# Patient Record
Sex: FEMALE | Race: WHITE | NOT HISPANIC OR LATINO | Employment: FULL TIME | URBAN - METROPOLITAN AREA
[De-identification: names, ages, dates, MRNs, and addresses within clinical notes are randomized per-mention and may not be internally consistent; named-entity substitution may affect disease eponyms.]

---

## 2021-04-27 ENCOUNTER — OFFICE VISIT (OUTPATIENT)
Dept: PHYSICAL THERAPY | Facility: CLINIC | Age: 56
End: 2021-04-27
Payer: COMMERCIAL

## 2021-04-27 DIAGNOSIS — G89.29 CHRONIC LEFT SHOULDER PAIN: Primary | ICD-10-CM

## 2021-04-27 DIAGNOSIS — M54.16 LUMBAR RADICULOPATHY: ICD-10-CM

## 2021-04-27 DIAGNOSIS — M25.552 LEFT HIP PAIN: ICD-10-CM

## 2021-04-27 DIAGNOSIS — M25.512 CHRONIC LEFT SHOULDER PAIN: Primary | ICD-10-CM

## 2021-04-27 PROCEDURE — 97161 PT EVAL LOW COMPLEX 20 MIN: CPT

## 2021-04-27 NOTE — LETTER
2021    Sandra Miranda DO  [address]  [address] [facility]    Patient: Annie Rodríguez   YOB: 1965   Date of Visit: 2021     Encounter Diagnosis     ICD-10-CM    1  Chronic left shoulder pain  M25 512     G89 29    2  Left hip pain  M25 552    3  Lumbar radiculopathy  M54 16        Dear Dr Camille Alarcon: Thank you for your recent referral of Annie Rodríguez  Please review the attached evaluation summary from Munira's recent visit  Please verify that you agree with the plan of care by signing the attached order  If you have any questions or concerns, please do not hesitate to call  I sincerely appreciate the opportunity to share in the care of one of your patients and hope to have another opportunity to work with you in the near future  Sincerely,    Meron Reyes, PT      Referring Provider:      I certify that I have read the below Plan of Care and certify the need for these services furnished under this plan of treatment while under my care  Sandra Miranda DO  [address]  [address] [facility]  Via Fax: [phone]          PT Evaluation     Today's date: 2021  Patient name: Annie Rodríguez  : 1965  MRN: [id number]  Referring provider: Lion Helm PT  Dx:   Encounter Diagnosis     ICD-10-CM    1  Chronic left shoulder pain  M25 512     G89 29    2  Left hip pain  M25 552    3  Lumbar radiculopathy  M54 16        Start Time: 830  Stop Time: 930  Total time in clinic (min): 60 minutes    Assessment  Assessment details: Patient is a 59 yo female presenting to her direct access physical therapy evaluation with symptoms consistent with L shoulder/elbow pain as well as L sided lumbar radiculopathy that started about 6 weeks ago  Patient presents with decreased hip and shoulder strength, decreased lumbar and cervical ROM, decreased endurance, decreased  strength and poor posture   Patient has increased difficulty with standing and sitting for a period of time, sleeping, work related activities  PT educated the patient on posture and was provided a HEP  PT will address the noted impairments by performing hip and shoulder strengthening, stretching, balance, functional activities and manual techniques to allow the patient to return to her PLOF  PT recommended 1-2x/week for 4-6 weeks c a good prognosis 2* PLOF  Impairments: abnormal or restricted ROM, activity intolerance, impaired physical strength, lacks appropriate home exercise program, pain with function, poor posture  and poor body mechanics    Symptom irritability: lowUnderstanding of Dx/Px/POC: good   Prognosis: good    Goals  STG: In four weeks the patient will:    1  Be (I) with her HEP  2  Increase hip and shoulder strength to 4+/5 MMT score to assist c ADLs  3  Increase lumbar and cervical ROM by 25% to assist c work related activities  LTG: In six weeks, the patient will:    1  Increase FOTO score to 76 for the shoulder and 93 for the hip to demonstrate improvements in symptoms and function  2  Demonstrate full lumbar and cervical AROM without pain  3  Perform 60-90 mins of sitting at work without pain  4  Increase hip and shoulder strength to 5/5 MMT score to assist c prolonged activities  5  Walk 2 miles without discomfort in the LEs          Plan  Patient would benefit from: skilled physical therapy  Referral necessary: Yes  Planned modality interventions: cryotherapy and thermotherapy: hydrocollator packs  Planned therapy interventions: abdominal trunk stabilization, joint mobilization, manual therapy, massage, neuromuscular re-education, patient education, postural training, strengthening, stretching, therapeutic activities, therapeutic exercise, transfer training, balance, body mechanics training, breathing training, functional ROM exercises, home exercise program and flexibility  Frequency: 2x week  Duration in visits: 12  Duration in weeks: 6  Plan of Care beginning date: 2021  Plan of Care expiration date: 2021  Treatment plan discussed with: patient        Subjective Evaluation    History of Present Illness  Mechanism of injury: Patient ntoed about 6 weeks ago she did a yoga pose and noted a twinge in her hamstring  After that she has on and off burning pain shooting from the anterior hip to the L foot  Patient noted some numbness on the top of the foot when she wakes up  Patient noted her knee has given out twice  Patient noted that she has increased discomfort with prolonged positions such as walking and sitting  Patient noted that her L shoulder pain started a while ago  Patient noted that she is L handed, however she uses her R hand with the mouse  Patient noted she rotates when working when seated or standing  Patient noted no pain with lifting, however she has pain with prolonged positions             Recurrent probem    Quality of life: good    Pain  Current pain ratin  At best pain ratin  At worst pain rating: 3  Location: L posterior hip to anterior thigh to L foot; L side of neck -> shoulder to elbow  Quality: burning, dull ache and discomfort (Numbness at times)  Relieving factors: ice, heat and rest  Aggravating factors: standing, walking and sitting  Progression: worsening    Social Support  Steps to enter house: no  Stairs in house: no   Lives in: [place] house  Lives with: spouse    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)  Hand dominance: left      Diagnostic Tests  No diagnostic tests performed  Patient Goals  Patient goals for therapy: increased strength, independence with ADLs/IADLs, return to sport/leisure activities, decreased pain and increased motion  Patient goal: "to learn exercises to assist c pain " "to work with less pain "        Objective     Postural Observations  Seated posture: poor  Standing posture: poor  Correction of posture: makes symptoms better        Neurological Testing     Sensation     Lumbar   Left   Intact: light touch    Right   Intact: light touch    Active Range of Motion   Cervical/Thoracic Spine       Cervical    Flexion:  Restriction level: moderate  Extension:  Restriction level: minimal  Left lateral flexion:  Restriction level: minimal  Right lateral flexion:  with pain Restriction level moderate  Left rotation:  Restriction level: moderate  Right rotation:  Restriction level: moderate    Lumbar   Flexion:  Restriction level: moderate  Extension:  with pain Restriction level: minimal  Left lateral flexion:  Restriction level: minimal  Right lateral flexion:  Restriction level: minimal  Left rotation:  Restriction level: minimal  Right rotation:  Restriction level: minimal    Strength/Myotome Testing     Left Shoulder     Planes of Motion   Flexion: 4   Abduction: 4-   External rotation at 0°: 4-   Internal rotation at 0°: 4-     Right Shoulder     Planes of Motion   Flexion: 4   Abduction: 4-   External rotation at 0°: 4-   Internal rotation at 0°: 4-     Left Elbow   Flexion: 4  Extension: 4    Right Elbow   Flexion: 4  Extension: 4    Left Wrist/Hand   Wrist extension: 4  Wrist flexion: 4     (2nd hand position)     Trial 1: 18    Right Wrist/Hand   Wrist extension: 4+  Wrist flexion: 4+     (2nd hand position)     Trial 1: 32    Left Hip   Planes of Motion   Flexion: 4  Abduction: 4-  Adduction: 4  External rotation: 4-  Internal rotation: 4-    Right Hip   Planes of Motion   Flexion: 4  Abduction: 4-  Adduction: 4  External rotation: 4-  Internal rotation: 4-    Left Knee   Extension: 4    Right Knee   Extension: 4    Left Ankle/Foot   Dorsiflexion: 4+  Plantar flexion: 4+    Right Ankle/Foot   Dorsiflexion: 4+  Plantar flexion: 4+    General Comments:      Shoulder Comments   Shoulder ROM WNL             Precautions: none      Manuals 4/27            Cervical mobilizations  nv            Piriformis STM nv            Wrist flexors STM nv                         Neuro Re-Ed             Posture edu MW            Sciatic nerve glides x10 ea            Serratus punch x10            Chin tuck nv            Scapular retraction x10  5" hold            clams nv            Eccentric wrist flexion and extension  nv            Ther Ex             X-ride nv            Piriformis stretch 3x30" ea            Open books nv            UT and levator scap stretch nv            Rhomboid stretch nv            Hip add nv            Hip abd nv            Wrist flexion and extension stretch nv            Ther Activity                                       Gait Training                                       Modalities

## 2021-04-27 NOTE — PROGRESS NOTES
PT Evaluation     Today's date: 2021  Patient name: Gen Barkley  : 1965  MRN: [id number]  Referring provider: Janes Edwards PT  Dx:   Encounter Diagnosis     ICD-10-CM    1  Chronic left shoulder pain  M25 512     G89 29    2  Left hip pain  M25 552    3  Lumbar radiculopathy  M54 16        Start Time: 830  Stop Time: 930  Total time in clinic (min): 60 minutes    Assessment  Assessment details: Patient is a 61 yo female presenting to her direct access physical therapy evaluation with symptoms consistent with L shoulder/elbow pain as well as L sided lumbar radiculopathy that started about 6 weeks ago  Patient presents with decreased hip and shoulder strength, decreased lumbar and cervical ROM, decreased endurance, decreased  strength and poor posture  Patient has increased difficulty with standing and sitting for a period of time, sleeping, work related activities  PT educated the patient on posture and was provided a HEP  PT will address the noted impairments by performing hip and shoulder strengthening, stretching, balance, functional activities and manual techniques to allow the patient to return to her PLOF  PT recommended 1-2x/week for 4-6 weeks c a good prognosis 2* PLOF  Impairments: abnormal or restricted ROM, activity intolerance, impaired physical strength, lacks appropriate home exercise program, pain with function, poor posture  and poor body mechanics    Symptom irritability: lowUnderstanding of Dx/Px/POC: good   Prognosis: good    Goals  STG: In four weeks the patient will:    1  Be (I) with her HEP  2  Increase hip and shoulder strength to 4+/5 MMT score to assist c ADLs  3  Increase lumbar and cervical ROM by 25% to assist c work related activities  LTG: In six weeks, the patient will:    1  Increase FOTO score to 76 for the shoulder and 93 for the hip to demonstrate improvements in symptoms and function    2  Demonstrate full lumbar and cervical AROM without pain   3  Perform 60-90 mins of sitting at work without pain  4  Increase hip and shoulder strength to 5/5 MMT score to assist c prolonged activities  5  Walk 2 miles without discomfort in the LEs  Plan  Patient would benefit from: skilled physical therapy  Referral necessary: Yes  Planned modality interventions: cryotherapy and thermotherapy: hydrocollator packs  Planned therapy interventions: abdominal trunk stabilization, joint mobilization, manual therapy, massage, neuromuscular re-education, patient education, postural training, strengthening, stretching, therapeutic activities, therapeutic exercise, transfer training, balance, body mechanics training, breathing training, functional ROM exercises, home exercise program and flexibility  Frequency: 2x week  Duration in visits: 12  Duration in weeks: 6  Plan of Care beginning date: 2021  Plan of Care expiration date: 2021  Treatment plan discussed with: patient        Subjective Evaluation    History of Present Illness  Mechanism of injury: Patient ntoed about 6 weeks ago she did a yoga pose and noted a twinge in her hamstring  After that she has on and off burning pain shooting from the anterior hip to the L foot  Patient noted some numbness on the top of the foot when she wakes up  Patient noted her knee has given out twice  Patient noted that she has increased discomfort with prolonged positions such as walking and sitting  Patient noted that her L shoulder pain started a while ago  Patient noted that she is L handed, however she uses her R hand with the mouse  Patient noted she rotates when working when seated or standing  Patient noted no pain with lifting, however she has pain with prolonged positions             Recurrent probem    Quality of life: good    Pain  Current pain ratin  At best pain ratin  At worst pain rating: 3  Location: L posterior hip to anterior thigh to L foot; L side of neck -> shoulder to elbow  Quality: burning, dull ache and discomfort (Numbness at times)  Relieving factors: ice, heat and rest  Aggravating factors: standing, walking and sitting  Progression: worsening    Social Support  Steps to enter house: no  Stairs in house: no   Lives in: [place] house  Lives with: spouse    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)  Hand dominance: left      Diagnostic Tests  No diagnostic tests performed  Patient Goals  Patient goals for therapy: increased strength, independence with ADLs/IADLs, return to sport/leisure activities, decreased pain and increased motion  Patient goal: "to learn exercises to assist c pain " "to work with less pain "        Objective     Postural Observations  Seated posture: poor  Standing posture: poor  Correction of posture: makes symptoms better        Neurological Testing     Sensation     Lumbar   Left   Intact: light touch    Right   Intact: light touch    Active Range of Motion   Cervical/Thoracic Spine       Cervical    Flexion:  Restriction level: moderate  Extension:  Restriction level: minimal  Left lateral flexion:  Restriction level: minimal  Right lateral flexion:  with pain Restriction level moderate  Left rotation:  Restriction level: moderate  Right rotation:  Restriction level: moderate    Lumbar   Flexion:  Restriction level: moderate  Extension:  with pain Restriction level: minimal  Left lateral flexion:  Restriction level: minimal  Right lateral flexion:  Restriction level: minimal  Left rotation:  Restriction level: minimal  Right rotation:  Restriction level: minimal    Strength/Myotome Testing     Left Shoulder     Planes of Motion   Flexion: 4   Abduction: 4-   External rotation at 0°: 4-   Internal rotation at 0°: 4-     Right Shoulder     Planes of Motion   Flexion: 4   Abduction: 4-   External rotation at 0°: 4-   Internal rotation at 0°: 4-     Left Elbow   Flexion: 4  Extension: 4    Right Elbow   Flexion: 4  Extension: 4    Left Wrist/Hand   Wrist extension: 4  Wrist flexion: 4     (2nd hand position)     Trial 1: 18    Right Wrist/Hand   Wrist extension: 4+  Wrist flexion: 4+     (2nd hand position)     Trial 1: 32    Left Hip   Planes of Motion   Flexion: 4  Abduction: 4-  Adduction: 4  External rotation: 4-  Internal rotation: 4-    Right Hip   Planes of Motion   Flexion: 4  Abduction: 4-  Adduction: 4  External rotation: 4-  Internal rotation: 4-    Left Knee   Extension: 4    Right Knee   Extension: 4    Left Ankle/Foot   Dorsiflexion: 4+  Plantar flexion: 4+    Right Ankle/Foot   Dorsiflexion: 4+  Plantar flexion: 4+    General Comments:      Shoulder Comments   Shoulder ROM WNL             Precautions: none      Manuals 4/27            Cervical mobilizations  nv            Piriformis STM nv            Wrist flexors STM nv                         Neuro Re-Ed             Posture edu MW            Sciatic nerve glides x10 ea            Serratus punch x10            Chin tuck nv            Scapular retraction x10  5" hold            clams nv            Eccentric wrist flexion and extension  nv            Ther Ex             X-ride nv            Piriformis stretch 3x30" ea            Open books nv            UT and levator scap stretch nv            Rhomboid stretch nv            Hip add nv            Hip abd nv            Wrist flexion and extension stretch nv            Ther Activity                                       Gait Training                                       Modalities

## 2021-04-28 ENCOUNTER — TRANSCRIBE ORDERS (OUTPATIENT)
Dept: PHYSICAL THERAPY | Facility: CLINIC | Age: 56
End: 2021-04-28

## 2021-04-28 DIAGNOSIS — M25.552 LEFT HIP PAIN: ICD-10-CM

## 2021-04-28 DIAGNOSIS — M54.16 LUMBAR RADICULOPATHY: ICD-10-CM

## 2021-04-28 DIAGNOSIS — G89.29 CHRONIC LEFT SHOULDER PAIN: Primary | ICD-10-CM

## 2021-04-28 DIAGNOSIS — M25.512 CHRONIC LEFT SHOULDER PAIN: Primary | ICD-10-CM

## 2021-04-29 ENCOUNTER — OFFICE VISIT (OUTPATIENT)
Dept: PHYSICAL THERAPY | Facility: CLINIC | Age: 56
End: 2021-04-29
Payer: COMMERCIAL

## 2021-04-29 DIAGNOSIS — M25.512 CHRONIC LEFT SHOULDER PAIN: Primary | ICD-10-CM

## 2021-04-29 DIAGNOSIS — M54.16 LUMBAR RADICULOPATHY: ICD-10-CM

## 2021-04-29 DIAGNOSIS — M25.552 LEFT HIP PAIN: ICD-10-CM

## 2021-04-29 DIAGNOSIS — G89.29 CHRONIC LEFT SHOULDER PAIN: Primary | ICD-10-CM

## 2021-04-29 PROCEDURE — 97112 NEUROMUSCULAR REEDUCATION: CPT

## 2021-04-29 NOTE — PROGRESS NOTES
Daily Note     Today's date: 2021  Patient name: Willena Castleman  : 1965  MRN: [id number]  Referring provider: Catherine Hwang, PT  Dx:   Encounter Diagnosis     ICD-10-CM    1  Chronic left shoulder pain  M25 512     G89 29    2  Left hip pain  M25 552    3  Lumbar radiculopathy  M54 16        Start Time: 730  Stop Time: 830  Total time in clinic (min): 60 minutes    Subjective: Patient noted her knee gave out on Tuesday, however after ice it feels better  Patient has been performing her HEP  Objective: See treatment diary below      Assessment: Patient performed various strengthening and stretching exercises to assist with pain in the shoulder and hip  Patient required min VCs for form throughout the session  Patient noted less pain post session  PT added to her HEP  Patient would benefit from continued PT to allow the patient to return to her PLOF  Plan: Continue per plan of care        Precautions: none      Manuals            Cervical mobilizations  nv            Piriformis STM nv            Wrist flexors STM nv                         Neuro Re-Ed             Posture edu MW HEP edu           Sciatic nerve glides x10 ea 2x10 ea           Serratus punch x10 2x10           Chin tuck nv 2x10           Scapular retraction x10  5" hold 2x10  5" hold           clams nv            Eccentric wrist flexion and extension  nv            Ther Ex             X-ride nv 10'           Piriformis stretch 3x30" ea 3x30" ea           Open books nv            UT and levator scap stretch nv            Rhomboid stretch nv 5x15" ea           Hip add nv 2x10  5" hold           sidelying Hip abd nv 2x10 ea           Wrist flexion and extension stretch nv 3x30" ea           Ther Activity                                       Gait Training                                       Modalities

## 2021-05-03 ENCOUNTER — OFFICE VISIT (OUTPATIENT)
Dept: PHYSICAL THERAPY | Facility: CLINIC | Age: 56
End: 2021-05-03
Payer: COMMERCIAL

## 2021-05-03 DIAGNOSIS — G89.29 CHRONIC LEFT SHOULDER PAIN: Primary | ICD-10-CM

## 2021-05-03 DIAGNOSIS — M54.16 LUMBAR RADICULOPATHY: ICD-10-CM

## 2021-05-03 DIAGNOSIS — M25.552 LEFT HIP PAIN: ICD-10-CM

## 2021-05-03 DIAGNOSIS — M25.512 CHRONIC LEFT SHOULDER PAIN: Primary | ICD-10-CM

## 2021-05-03 PROCEDURE — 97112 NEUROMUSCULAR REEDUCATION: CPT

## 2021-05-03 NOTE — PROGRESS NOTES
Daily Note     Today's date: 5/3/2021  Patient name: Kerwin Lopez  : 1965  MRN: [id number]  Referring provider: Pasquale Ramey, DARNELL  Dx:   Encounter Diagnosis     ICD-10-CM    1  Chronic left shoulder pain  M25 512     G89 29    2  Left hip pain  M25 552    3  Lumbar radiculopathy  M54 16        Start Time: 730  Stop Time: 830  Total time in clinic (min): 60 minutes    Subjective: Patient noted she felt good after last session, however the pain came back the next day  Patient noted she performed her HEP over the weekend  Objective: See treatment diary below      Assessment: PT performed STM to the L piriformis and glute med to assist c pain levels  Patient noted improvements post manuals  Patient required min VCs for form throughout the session  PT introduced sidelying shoulder external rotation to assist c shoulder stabilization  PT added to HEP  Patient would benefit from continued PT to allow the patient to return to her PLOF  Plan: Continue per plan of care        Precautions: none      Manuals  5/3          Cervical mobilizations  nv            Piriformis STM nv  MW          Wrist flexors STM nv                         Neuro Re-Ed             Posture edu MW HEP edu           Sciatic nerve glides x10 ea 2x10 ea 2x10 ea          Serratus punch x10 2x10 2x10          Chin tuck nv 2x10 2x10          Scapular retraction x10  5" hold 2x10  5" hold           clams nv  nv          Eccentric wrist flexion and extension  nv            sidelying external rotation   2x10 ea          Ther Ex             X-ride nv 10' 10'          Piriformis stretch 3x30" ea 3x30" ea 3x30" ea          Open books nv  5x15" ea          UT and levator scap stretch nv            Rhomboid stretch nv 5x15" ea 5x15"ea          Hip add nv 2x10  5" hold           sidelying Hip abd nv 2x10 ea 2x10 ea          Wrist flexion and extension stretch nv 3x30" ea 3x30" ea          Ther Activity Gait Training                                       Modalities

## 2021-05-05 ENCOUNTER — OFFICE VISIT (OUTPATIENT)
Dept: PHYSICAL THERAPY | Facility: CLINIC | Age: 56
End: 2021-05-05
Payer: COMMERCIAL

## 2021-05-05 DIAGNOSIS — M54.16 LUMBAR RADICULOPATHY: ICD-10-CM

## 2021-05-05 DIAGNOSIS — M25.552 LEFT HIP PAIN: ICD-10-CM

## 2021-05-05 DIAGNOSIS — M25.512 CHRONIC LEFT SHOULDER PAIN: Primary | ICD-10-CM

## 2021-05-05 DIAGNOSIS — G89.29 CHRONIC LEFT SHOULDER PAIN: Primary | ICD-10-CM

## 2021-05-05 PROCEDURE — 97112 NEUROMUSCULAR REEDUCATION: CPT

## 2021-05-05 NOTE — PROGRESS NOTES
Daily Note     Today's date: 2021  Patient name: Minda Sharpe  : 1965  MRN: [id number]  Referring provider: Farooq Ghotra PT  Dx:   Encounter Diagnosis     ICD-10-CM    1  Chronic left shoulder pain  M25 512     G89 29    2  Left hip pain  M25 552    3  Lumbar radiculopathy  M54 16        Start Time: 30  Stop Time: 1030  Total time in clinic (min): 60 minutes    Subjective: Patient noted that she felt good after last session  Patient noted in general she is sore on the L side possibly from the weather  Objective: See treatment diary below      Assessment: PT performed STM to the piriformis and L infraspinatus due to pain  Patient noted improvements post manuals  PT introduced clams to assist c stabilization  Patient required moderate cues throughout the session  Patient noted less pain at the end of the session  Patient would benefit from continued PT to allow the patient to return to her PLOF  Plan: Continue per plan of care        Precautions: none      Manuals 4/27 4/29 5/3 5         Cervical mobilizations  nv            Piriformis STM nv  MW MW   + L infraspinatus         Wrist flexors STM nv                         Neuro Re-Ed             Posture edu MW HEP edu           Sciatic nerve glides x10 ea 2x10 ea 2x10 ea          Serratus punch x10 2x10 2x10 x25         Chin tuck nv 2x10 2x10 x25  5" hold         Scapular retraction x10  5" hold 2x10  5" hold           clams nv  nv 2x10 ea         Eccentric wrist flexion and extension  nv            sidelying external rotation   2x10 ea 2x10 ea         Ther Ex             X-ride nv 10' 10' 10'         Piriformis stretch 3x30" ea 3x30" ea 3x30" ea 3x30" ea         Open books nv  5x15" ea 5x15" ea         UT and levator scap stretch nv            Rhomboid stretch nv 5x15" ea 5x15"ea          Hip add nv 2x10  5" hold           sidelying Hip abd nv 2x10 ea 2x10 ea 2x10 e         Wrist flexion and extension stretch nv 3x30" ea 3x30" ea          Ther Activity                                       Gait Training                                       Modalities

## 2021-05-11 ENCOUNTER — OFFICE VISIT (OUTPATIENT)
Dept: PHYSICAL THERAPY | Facility: CLINIC | Age: 56
End: 2021-05-11
Payer: COMMERCIAL

## 2021-05-11 DIAGNOSIS — M25.512 CHRONIC LEFT SHOULDER PAIN: Primary | ICD-10-CM

## 2021-05-11 DIAGNOSIS — M54.16 LUMBAR RADICULOPATHY: ICD-10-CM

## 2021-05-11 DIAGNOSIS — M25.552 LEFT HIP PAIN: ICD-10-CM

## 2021-05-11 DIAGNOSIS — G89.29 CHRONIC LEFT SHOULDER PAIN: Primary | ICD-10-CM

## 2021-05-11 PROCEDURE — 97112 NEUROMUSCULAR REEDUCATION: CPT

## 2021-05-11 NOTE — PROGRESS NOTES
Daily Note     Today's date: 2021  Patient name: Keyanna Melara  : 1965  MRN: [id number]  Referring provider: Roseanne Plummer PT  Dx:   Encounter Diagnosis     ICD-10-CM    1  Chronic left shoulder pain  M25 512     G89 29    2  Left hip pain  M25 552    3  Lumbar radiculopathy  M54 16        Start Time: 830  Stop Time: 925  Total time in clinic (min): 55 minutes    Subjective: Patient noted the weekend went well, however her knee gave out yesterday for about an hour  Objective: See treatment diary below      Assessment: PT introduced the leg press as well as 3 way hip to assist c stabilization in the knee for functional activities  Patient required min VCs, however did not note pain or a giving out sensation  PT added to her HEP  Overall the patient continues to improve with strength 2* less breaks throughout the session  Patient would benefit from continued PT to allow the patient to return to her PLOF  Plan: Continue per plan of care        Precautions: none      Manuals 4/27 4/29 5/3 5/5 5/11        Cervical mobilizations  nv            Piriformis STM nv  MW MW   + L infraspinatus         Wrist flexors STM nv                         Neuro Re-Ed             Posture edu MW HEP edu           Sciatic nerve glides x10 ea 2x10 ea 2x10 ea  HEP        Serratus punch x10 2x10 2x10 x25 2x10  2#        Chin tuck nv 2x10 2x10 x25  5" hold         Scapular retraction x10  5" hold 2x10  5" hold   HEP        clams nv  nv 2x10 ea 2x10 ea        Eccentric wrist flexion and extension  nv            Leg press     DL: 60#  2x10  SL: 30# ea        sidelying external rotation   2x10 ea 2x10 ea 2x10 ea        Ther Ex             X-ride nv 10' 10' 10' 10'        Piriformis stretch 3x30" ea 3x30" ea 3x30" ea 3x30" ea 3x30" ea        Open books nv  5x15" ea 5x15" ea 5x15" ea        UT and levator scap stretch nv            Rhomboid stretch nv 5x15" ea 5x15"ea          Hip add nv 2x10  5" hold sidelying Hip abd nv 2x10 ea 2x10 ea 2x10 ea 2x10 ea        Wrist flexion and extension stretch nv 3x30" ea 3x30" ea          3 way hip     2x10 ea  firm        Ther Activity                                       Gait Training                                       Modalities

## 2021-05-13 ENCOUNTER — OFFICE VISIT (OUTPATIENT)
Dept: PHYSICAL THERAPY | Facility: CLINIC | Age: 56
End: 2021-05-13
Payer: COMMERCIAL

## 2021-05-13 DIAGNOSIS — M25.512 CHRONIC LEFT SHOULDER PAIN: Primary | ICD-10-CM

## 2021-05-13 DIAGNOSIS — M54.16 LUMBAR RADICULOPATHY: ICD-10-CM

## 2021-05-13 DIAGNOSIS — M25.552 LEFT HIP PAIN: ICD-10-CM

## 2021-05-13 DIAGNOSIS — G89.29 CHRONIC LEFT SHOULDER PAIN: Primary | ICD-10-CM

## 2021-05-13 PROCEDURE — 97112 NEUROMUSCULAR REEDUCATION: CPT

## 2021-05-13 NOTE — PROGRESS NOTES
Daily Note     Today's date: 2021  Patient name: Miracle Antonio  : 1965  MRN: [id number]  Referring provider: Gunjan Castrejon, PT  Dx:   Encounter Diagnosis     ICD-10-CM    1  Chronic left shoulder pain  M25 512     G89 29    2  Left hip pain  M25 552    3  Lumbar radiculopathy  M54 16        Start Time: 1200  Stop Time: 1255  Total time in clinic (min): 55 minutes    Subjective: Patient noted in general she is feeling good, however her shoulder is a little sore due to working at her desk  Objective: See treatment diary below      Assessment: Patient performed strengthening exercises with min VCs for form and the speed at which she performs them  In general her strength has been improving due to increased reps and less breaks  PT performed trigger point release to piriformis/glute med, UT and infraspinatus to assist c pain levels  Patient noted she felt better post session  PT educated the patient on her HEP  Patient would benefit from continued PT to allow the patient to return to her PLOF  Plan: Continue per plan of care        Precautions: none      Manuals 4/27 4/29 5/3 5/5 5/11 5/13       Cervical mobilizations  nv            Piriformis STM nv  MW MW   + L infraspinatus  MW       Wrist flexors STM nv            UT and infraspinatus (L)      MW       Neuro Re-Ed             Posture edu MW HEP edu           Sciatic nerve glides x10 ea 2x10 ea 2x10 ea  HEP        Serratus punch x10 2x10 2x10 x25 2x10  2# nv       Chin tuck nv 2x10 2x10 x25  5" hold         Scapular retraction x10  5" hold 2x10  5" hold   HEP        clams nv  nv 2x10 ea 2x10 ea x25 ea       Eccentric wrist flexion and extension  nv            Leg press     DL: 60#  2x10  SL: 30# ea DL: 60#  2x10  SL: 30# ea       sidelying external rotation   2x10 ea 2x10 ea 2x10 ea x25 ea       Prone I and T      nv       Ther Ex             X-ride nv 10' 10' 10' 10' 10'       Piriformis stretch 3x30" ea 3x30" ea 3x30" ea 3x30" ea 3x30" ea        Open books nv  5x15" ea 5x15" ea 5x15" ea        UT and levator scap stretch nv            Rhomboid stretch nv 5x15" ea 5x15"ea          Hip add nv 2x10  5" hold           sidelying Hip abd nv 2x10 ea 2x10 ea 2x10 ea 2x10 ea nv       Wrist flexion and extension stretch nv 3x30" ea 3x30" ea          3 way hip     2x10 ea  firm nv       Ther Activity                                       Gait Training                                       Modalities

## 2021-05-14 ENCOUNTER — APPOINTMENT (OUTPATIENT)
Dept: PHYSICAL THERAPY | Facility: CLINIC | Age: 56
End: 2021-05-14
Payer: COMMERCIAL

## 2021-05-17 ENCOUNTER — OFFICE VISIT (OUTPATIENT)
Dept: PHYSICAL THERAPY | Facility: CLINIC | Age: 56
End: 2021-05-17
Payer: COMMERCIAL

## 2021-05-17 DIAGNOSIS — M25.512 CHRONIC LEFT SHOULDER PAIN: Primary | ICD-10-CM

## 2021-05-17 DIAGNOSIS — G89.29 CHRONIC LEFT SHOULDER PAIN: Primary | ICD-10-CM

## 2021-05-17 DIAGNOSIS — M54.16 LUMBAR RADICULOPATHY: ICD-10-CM

## 2021-05-17 DIAGNOSIS — M25.552 LEFT HIP PAIN: ICD-10-CM

## 2021-05-17 PROCEDURE — 97112 NEUROMUSCULAR REEDUCATION: CPT

## 2021-05-17 NOTE — PROGRESS NOTES
Daily Note     Today's date: 2021  Patient name: Roberth Fitzgerald  : 1965  MRN: [id number]  Referring provider: Shon Leroy PT  Dx:   Encounter Diagnosis     ICD-10-CM    1  Chronic left shoulder pain  M25 512     G89 29    2  Left hip pain  M25 552    3  Lumbar radiculopathy  M54 16        Start Time: 08  Stop Time: 930  Total time in clinic (min): 60 minutes    Subjective: Patient noted her knee did not give way this past weekend and her neck is feeling better  Objective: See treatment diary below      Assessment: PT introduced prone I and Ts as well as the skier stretch to assist c stabilization for reaching  Patient required min VCs for form throughout the session  Patient continues to improve with strength 2* less pain with ADLs  Patient noted a decrease in pain post manuals  Patient would benefit from continued PT to allow the patient to return to her PLOF  Plan: Continue per plan of care        Precautions: none      Manuals 4/27 4/29 5/3 5/5 5/11 5/13 5/17      Cervical mobilizations  nv            Piriformis STM nv  MW MW   + L infraspinatus  MW       Wrist flexors STM nv            UT and infraspinatus (L)      MW MW  + sub-occipital release      Neuro Re-Ed             Posture edu MW HEP edu           Sciatic nerve glides x10 ea 2x10 ea 2x10 ea  HEP        Serratus punch x10 2x10 2x10 x25 2x10  2# nv 2x10  3#      Chin tuck nv 2x10 2x10 x25  5" hold         Scapular retraction x10  5" hold 2x10  5" hold   HEP        clams nv  nv 2x10 ea 2x10 ea x25 ea x25 ea      Eccentric wrist flexion and extension  nv            Leg press     DL: 60#  2x10  SL: 30# ea  2x10 DL: 60#  2x10  SL: 30# ea  2x10 DL: 60#  2x10  SL: 30# ea  2x10      sidelying external rotation   2x10 ea 2x10 ea 2x10 ea x25 ea x25 ea      Prone I and T      nv 2x10 ea      Ther Ex             X-ride nv 10' 10' 10' 10' 10' 10' UBEretro      Piriformis stretch 3x30" ea 3x30" ea 3x30" ea 3x30" ea 3x30" ea  3x30" ea Open books nv  5x15" ea 5x15" ea 5x15" ea  5x15" ea      UT and levator scap stretch nv            Rhomboid stretch nv 5x15" ea 5x15"ea          Hip add nv 2x10  5" hold           sidelying Hip abd nv 2x10 ea 2x10 ea 2x10 ea 2x10 ea nv 2x10 ea      Wrist flexion and extension stretch nv 3x30" ea 3x30" ea          3 way hip     2x10 ea  firm nv 2x10 ea  foam      Butterfly stretch       3x30"      Skier stretch       5x15"      Ther Activity                                       Gait Training                                       Modalities

## 2021-05-20 ENCOUNTER — OFFICE VISIT (OUTPATIENT)
Dept: PHYSICAL THERAPY | Facility: CLINIC | Age: 56
End: 2021-05-20
Payer: COMMERCIAL

## 2021-05-20 DIAGNOSIS — M25.552 LEFT HIP PAIN: ICD-10-CM

## 2021-05-20 DIAGNOSIS — M25.512 CHRONIC LEFT SHOULDER PAIN: Primary | ICD-10-CM

## 2021-05-20 DIAGNOSIS — G89.29 CHRONIC LEFT SHOULDER PAIN: Primary | ICD-10-CM

## 2021-05-20 DIAGNOSIS — M54.16 LUMBAR RADICULOPATHY: ICD-10-CM

## 2021-05-20 PROCEDURE — 97112 NEUROMUSCULAR REEDUCATION: CPT

## 2021-05-20 NOTE — PROGRESS NOTES
Daily Note     Today's date: 2021  Patient name: Haja Ordaz  : 1965  MRN: [id number]  Referring provider: Mauro Dow, PT  Dx:   Encounter Diagnosis     ICD-10-CM    1  Chronic left shoulder pain  M25 512     G89 29    2  Left hip pain  M25 552    3  Lumbar radiculopathy  M54 16        Start Time: 933  Stop Time: 101  Total time in clinic (min): 42 minutes    Subjective: Patient noted she has been very stressed over the past few days  Patient noted her knee gave out yesterday  Objective: See treatment diary below      Assessment: PT educated the patient on performing a band exercise to simulate a leg press at home  PT also educated the patient on performing the 3 way hip and sidelying hip abd to assist c a level pelvis when walking  Patient required min VCs for form throughout the session  Patient noted improvements post manuals  Patient would benefit from continued PT to allow the patient to return to her PLOF  Plan: Continue per plan of care        Precautions: none      Manuals 4/27 4/29 5/3 5/5 5/11 5/13 5/17 5/20     Cervical mobilizations  nv            Piriformis STM nv  MW MW   + L infraspinatus  MW       Wrist flexors STM nv            UT and infraspinatus (L)      MW MW  + sub-occipital release MW  + sub-occipital release     Neuro Re-Ed             Posture edu MW HEP edu           Sciatic nerve glides x10 ea 2x10 ea 2x10 ea  HEP        Serratus punch x10 2x10 2x10 x25 2x10  2# nv 2x10  3#      Chin tuck nv 2x10 2x10 x25  5" hold         Scapular retraction x10  5" hold 2x10  5" hold   HEP        clams nv  nv 2x10 ea 2x10 ea x25 ea x25 ea      Eccentric wrist flexion and extension  nv            Leg press     DL: 60#  2x10  SL: 30# ea  2x10 DL: 60#  2x10  SL: 30# ea  2x10 DL: 60#  2x10  SL: 30# ea  2x10 DL: 60#  3x10  SL: 30# ea  3x10  Leg press HEP with band  BTB  x20 ea     sidelying external rotation   2x10 ea 2x10 ea 2x10 ea x25 ea x25 ea      Prone I and T      nv 2x10 ea 2x10 ea     Ther Ex             X-ride nv 10' 10' 10' 10' 10' 10' UBEretro X-ride  10'     Piriformis stretch 3x30" ea 3x30" ea 3x30" ea 3x30" ea 3x30" ea  3x30" ea 3x30" ea     Open books nv  5x15" ea 5x15" ea 5x15" ea  5x15" ea      UT and levator scap stretch nv            Rhomboid stretch nv 5x15" ea 5x15"ea          Hip add nv 2x10  5" hold           sidelying Hip abd nv 2x10 ea 2x10 ea 2x10 ea 2x10 ea nv 2x10 ea x25 ea     Wrist flexion and extension stretch nv 3x30" ea 3x30" ea          3 way hip     2x10 ea  firm nv 2x10 ea  foam 2x10 ea  firm     Butterfly stretch       3x30"      Skier stretch       5x15"      Ther Activity                                       Gait Training                                       Modalities

## 2021-06-01 ENCOUNTER — OFFICE VISIT (OUTPATIENT)
Dept: PHYSICAL THERAPY | Facility: CLINIC | Age: 56
End: 2021-06-01
Payer: COMMERCIAL

## 2021-06-01 DIAGNOSIS — M25.512 CHRONIC LEFT SHOULDER PAIN: Primary | ICD-10-CM

## 2021-06-01 DIAGNOSIS — G89.29 CHRONIC LEFT SHOULDER PAIN: Primary | ICD-10-CM

## 2021-06-01 DIAGNOSIS — M25.552 LEFT HIP PAIN: ICD-10-CM

## 2021-06-01 DIAGNOSIS — M54.16 LUMBAR RADICULOPATHY: ICD-10-CM

## 2021-06-01 PROCEDURE — 97112 NEUROMUSCULAR REEDUCATION: CPT

## 2021-06-01 NOTE — PROGRESS NOTES
Daily Note     Today's date: 2021  Patient name: Estephania Walters  : 1965  MRN: [id number]  Referring provider: Myrtle Chand DO  Dx:   Encounter Diagnosis     ICD-10-CM    1  Chronic left shoulder pain  M25 512     G89 29    2  Left hip pain  M25 552    3  Lumbar radiculopathy  M54 16        Start Time: 730  Stop Time: 830  Total time in clinic (min): 60 minutes    Subjective: Patient noted she was in a car ride for about 21 hours and noted some hip pain  Patient performed her stretches to decrease the pain  Patient noted that her neck feels pretty good  Patient noted her knee did not give out over the past week, however she continues to get the burning every once in a while in the L LE  Patient is getting a MRI later tonight on her lumbar spine  Objective: See treatment diary below      Assessment: PT introduced TA exercises to assist c stabilization of the lumbar spine while performing functional activities  Patient required min VCs for form throughout the session, however she noted no increased pain  Patient noted after manual techniques and exercises her burning pain decreased  Patient would benefit from continued PT to allow the patient to return to her PLOF  Plan: Continue per plan of care        Precautions: none      Manuals  5/3 5/ 6    Cervical mobilizations  nv            Piriformis STM nv  MW MW   + L infraspinatus  MW   MW L side    Wrist flexors STM nv            UT and infraspinatus (L)      MW MW  + sub-occipital release MW  + sub-occipital release     Neuro Re-Ed             Posture edu MW HEP edu           Sciatic nerve glides x10 ea 2x10 ea 2x10 ea  HEP    2x10    Serratus punch x10 2x10 2x10 x25 2x10  2# nv 2x10  3#      Chin tuck nv 2x10 2x10 x25  5" hold         Scapular retraction x10  5" hold 2x10  5" hold   HEP        clams nv  nv 2x10 ea 2x10 ea x25 ea x25 ea      Eccentric wrist flexion and extension  nv            Leg press     DL: 60#  2x10  SL: 30# ea  2x10 DL: 60#  2x10  SL: 30# ea  2x10 DL: 60#  2x10  SL: 30# ea  2x10 DL: 60#  3x10  SL: 30# ea  3x10  Leg press HEP with band  BTB  x20 ea nv    sidelying external rotation   2x10 ea 2x10 ea 2x10 ea x25 ea x25 ea      TA         2x10  3" hold    TA + fallout         x15 ea    TA + marching         x15 ea    TA + hip add         x15  3" hold    Prone I and T      nv 2x10 ea 2x10 ea     Ther Ex             X-ride nv 10' 10' 10' 10' 10' 10' UBEretro X-ride  10' X-ride  10'    Piriformis stretch 3x30" ea 3x30" ea 3x30" ea 3x30" ea 3x30" ea  3x30" ea 3x30" ea 3x30" ea    Open books nv  5x15" ea 5x15" ea 5x15" ea  5x15" ea  5x15" ea    UT and levator scap stretch nv            Rhomboid stretch nv 5x15" ea 5x15"ea          Hip add nv 2x10  5" hold           sidelying Hip abd nv 2x10 ea 2x10 ea 2x10 ea 2x10 ea nv 2x10 ea x25 ea     Wrist flexion and extension stretch nv 3x30" ea 3x30" ea          3 way hip     2x10 ea  firm nv 2x10 ea  foam 2x10 ea  firm     Butterfly stretch       3x30"  3x30"    Skier stretch       5x15"      Ther Activity                                       Gait Training                                       Modalities

## 2021-06-03 ENCOUNTER — OFFICE VISIT (OUTPATIENT)
Dept: PHYSICAL THERAPY | Facility: CLINIC | Age: 56
End: 2021-06-03
Payer: COMMERCIAL

## 2021-06-03 DIAGNOSIS — M54.16 LUMBAR RADICULOPATHY: ICD-10-CM

## 2021-06-03 DIAGNOSIS — M25.512 CHRONIC LEFT SHOULDER PAIN: Primary | ICD-10-CM

## 2021-06-03 DIAGNOSIS — M25.552 LEFT HIP PAIN: ICD-10-CM

## 2021-06-03 DIAGNOSIS — G89.29 CHRONIC LEFT SHOULDER PAIN: Primary | ICD-10-CM

## 2021-06-03 PROCEDURE — 97112 NEUROMUSCULAR REEDUCATION: CPT

## 2021-06-03 NOTE — LETTER
2021    Ana Mclain DO  [address]    Patient: Willena Castleman   YOB: 1965   Date of Visit: 6/3/2021     Encounter Diagnosis     ICD-10-CM    1  Chronic left shoulder pain  M25 512     G89 29    2  Left hip pain  M25 552    3  Lumbar radiculopathy  M54 16        Dear Dr Fazal Urban: Thank you for your recent referral of Willena Castleman  Please review the attached evaluation summary from Munira's recent visit  Please verify that you agree with the plan of care by signing the attached order  If you have any questions or concerns, please do not hesitate to call  I sincerely appreciate the opportunity to share in the care of one of your patients and hope to have another opportunity to work with you in the near future  Sincerely,    Joanne Pennington, PT      Referring Provider:      I certify that I have read the below Plan of Care and certify the need for these services furnished under this plan of treatment while under my care  Ana Mclain DO  [address]  [address] [place] [address]  Via Fax: [phone]          PT Re-Evaluation     Today's date: 6/3/2021  Patient name: Willena Castleman  : 1965  MRN: [id number]  Referring provider: Prince Bradford DO  Dx:   Encounter Diagnosis     ICD-10-CM    1  Chronic left shoulder pain  M25 512     G89 29    2  Left hip pain  M25 552    3  Lumbar radiculopathy  M54 16        Start Time: 1200  Stop Time: 1300  Total time in clinic (min): 60 minutes    Assessment  Assessment details: Patient is a 59 yo female presenting to phyisical therapy with symptoms consistent with L shoulder/elbow pain as well as L sided lumbar radiculopathy that started about 6 weeks ago  Since starting physical therapy the patient has improved with strength, cervical ROM, activity tolerance and posture   Patient is able to sit a work for a longer period of time with less pain, however she continues to be limited in endurance  Patient has also improved in stability in the L LE, however she has a faint burning pain every once in a while in the anterior thigh  PT will continue to address the noted impairments by performing hip and shoulder strengthening, stretching, balance, functional activities and manual techniques to allow the patient to return to her PLOF  PT recommended 2x/week for 2-4 weeks c a good prognosis 2* noted improvements  Impairments: abnormal or restricted ROM, activity intolerance, impaired physical strength, lacks appropriate home exercise program, pain with function, poor posture  and poor body mechanics    Symptom irritability: lowUnderstanding of Dx/Px/POC: good   Prognosis: good    Goals  STG: In four weeks the patient will:    1  Be (I) with her HEP  (in progress)  2  Increase hip and shoulder strength to 4+/5 MMT score to assist c ADLs  (in progress)  3  Increase lumbar and cervical ROM by 25% to assist c work related activities  (MET)      LTG: In six weeks, the patient will:    1  Increase FOTO score to 76 for the shoulder and 93 for the hip to demonstrate improvements in symptoms and function  (in progress)  2  Demonstrate full lumbar and cervical AROM without pain  (70% MET)  3  Perform 60-90 mins of sitting at work without pain  (90% MET)  4  Increase hip and shoulder strength to 5/5 MMT score to assist c prolonged activities  (In progress)  5  Walk 2 miles without discomfort in the LEs    (90% MET)      Plan  Patient would benefit from: skilled physical therapy  Referral necessary: No  Planned modality interventions: cryotherapy and thermotherapy: hydrocollator packs  Planned therapy interventions: abdominal trunk stabilization, joint mobilization, manual therapy, massage, neuromuscular re-education, patient education, postural training, strengthening, stretching, therapeutic activities, therapeutic exercise, transfer training, balance, body mechanics training, breathing training, functional ROM exercises, home exercise program and flexibility  Frequency: 2x week  Duration in visits: 8  Duration in weeks: 4  Plan of Care beginning date: 6/3/2021  Plan of Care expiration date: 2021  Treatment plan discussed with: patient        Subjective Evaluation    History of Present Illness  Mechanism of injury: Patient noted she feels about 70% back to her PLOF  Patient noted that her knee has not given out for about two weeks  Patient noted in general she feels stronger and her leg pain is less  Patient noted her posture has improved at work due to strength and this has helped her pain levels  Patient wants to continue to work on endurance in the LEs and UEs to be able to perform recreational activities and work related activities without pain             Recurrent probem    Quality of life: good    Pain  Current pain ratin  At best pain ratin  At worst pain ratin (at times)  Location: L posterior hip to anterior thigh to L foot; L side of neck -> shoulder to elbow  Quality: burning, dull ache and discomfort (Numbness at times)  Relieving factors: ice, heat and rest  Aggravating factors: standing, walking and sitting  Progression: improved    Social Support  Steps to enter house: no  Stairs in house: no   Lives in: [facility]  Lives with: spouse    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)  Hand dominance: left      Diagnostic Tests  No diagnostic tests performed  Patient Goals  Patient goals for therapy: increased strength, independence with ADLs/IADLs, return to sport/leisure activities and decreased pain  Patient goal: "to learn exercises to assist c pain " (70% MET) "to work with less pain " (70% MET)        Objective     Postural Observations  Seated posture: fair  Standing posture: fair  Correction of posture: makes symptoms better        Neurological Testing     Sensation     Lumbar   Left   Intact: light touch    Right   Intact: light touch    Active Range of Motion   Cervical/Thoracic Spine       Cervical    Flexion:  Restriction level: minimal  Extension:  Restriction level: minimal  Left lateral flexion:  Restriction level: minimal  Right lateral flexion:  Restriction level minimal  Left rotation:  Restriction level: minimal  Right rotation:  Restriction level: minimal    Lumbar   Flexion:  Restriction level: moderate  Extension:  with pain Restriction level: minimal  Left lateral flexion:  Restriction level: minimal  Right lateral flexion:  Restriction level: minimal  Left rotation:  Restriction level: minimal  Right rotation:  Restriction level: minimal    Strength/Myotome Testing     Left Shoulder     Planes of Motion   Flexion: 4   Abduction: 4+   External rotation at 0°: 4   Internal rotation at 0°: 4     Right Shoulder     Planes of Motion   Flexion: 4   Abduction: 4+   External rotation at 0°: 4   Internal rotation at 0°: 4     Left Elbow   Flexion: 5  Extension: 5    Right Elbow   Flexion: 5  Extension: 5    Left Wrist/Hand   Wrist extension: 5  Wrist flexion: 5     (2nd hand position)     Trial 1: 18    Right Wrist/Hand   Wrist extension: 5  Wrist flexion: 5     (2nd hand position)     Trial 1: 32    Left Hip   Planes of Motion   Flexion: 4+  Abduction: 4  Adduction: 5  External rotation: 4  Internal rotation: 4    Right Hip   Planes of Motion   Flexion: 5  Abduction: 4  Adduction: 5  External rotation: 4  Internal rotation: 4    Left Knee   Extension: 4    Right Knee   Extension: 4    Left Ankle/Foot   Dorsiflexion: 4+  Plantar flexion: 4+    Right Ankle/Foot   Dorsiflexion: 4+  Plantar flexion: 4+    General Comments:      Shoulder Comments   Shoulder ROM WNL      Flowsheet Rows      Most Recent Value   PT/OT G-Codes   Current Score  80   Projected Score  76             Precautions: none      Manuals 4/27 4/29 5/3 5/5 5/11 5/13 5/17 5/20 6/1 6/3   Cervical mobilizations  nv            Piriformis STM nv  MW MW   + L infraspinatus  MW   MW L side MW  L side   Wrist flexors STM nv            UT and infraspinatus (L)      MW MW  + sub-occipital release MW  + sub-occipital release     Re-eval          MW   Neuro Re-Ed             Posture edu MW HEP edu           Sciatic nerve glides x10 ea 2x10 ea 2x10 ea  HEP    2x10    Serratus punch x10 2x10 2x10 x25 2x10  2# nv 2x10  3#      Chin tuck nv 2x10 2x10 x25  5" hold         Scapular retraction x10  5" hold 2x10  5" hold   HEP        clams nv  nv 2x10 ea 2x10 ea x25 ea x25 ea      Eccentric wrist flexion and extension  nv            Leg press     DL: 60#  2x10  SL: 30# ea  2x10 DL: 60#  2x10  SL: 30# ea  2x10 DL: 60#  2x10  SL: 30# ea  2x10 DL: 60#  3x10  SL: 30# ea  3x10  Leg press HEP with band  BTB  x20 ea nv    sidelying external rotation   2x10 ea 2x10 ea 2x10 ea x25 ea x25 ea      TA         2x10  3" hold 2x10  5" hold   TA + fallout         x15 ea x15 ea   TA + marching         x15 ea x15 ea   TA + hip add         x15  3" hold 2x10  5" hold   Prone I and T      nv 2x10 ea 2x10 ea     Ther Ex             X-ride nv 10' 10' 10' 10' 10' 10' UBEretro X-ride  10' X-ride  10' UBE  Retro  10'   Piriformis stretch 3x30" ea 3x30" ea 3x30" ea 3x30" ea 3x30" ea  3x30" ea 3x30" ea 3x30" ea 3x30" ea   Open books nv  5x15" ea 5x15" ea 5x15" ea  5x15" ea  5x15" ea    UT and levator scap stretch nv            Rhomboid stretch nv 5x15" ea 5x15"ea          Hip add nv 2x10  5" hold           sidelying Hip abd nv 2x10 ea 2x10 ea 2x10 ea 2x10 ea nv 2x10 ea x25 ea     Wrist flexion and extension stretch nv 3x30" ea 3x30" ea          3 way hip     2x10 ea  firm nv 2x10 ea  foam 2x10 ea  firm     Butterfly stretch       3x30"  3x30"    Skier stretch       5x15"      Ther Activity                                       Gait Training                                       Modalities

## 2021-06-03 NOTE — PROGRESS NOTES
Daily Note     Today's date: 6/3/2021  Patient name: Kerwin Lopez  : 1965  MRN: [id number]  Referring provider: Brianna Gibbs DO  Dx:   Encounter Diagnosis     ICD-10-CM    1  Chronic left shoulder pain  M25 512     G89 29    2  Left hip pain  M25 552    3  Lumbar radiculopathy  M54 16                   Subjective: ***      Objective: See treatment diary below      Assessment: Tolerated treatment {Tolerated treatment :7338276871}   Patient {assessment:[id number]}      Plan: {PLAN:[id number]}     Precautions: none      Manuals 4/27 4/29 5/3 5/5 5/11 5/13 5/17 5/20 6/1    Cervical mobilizations  nv            Piriformis STM nv  MW MW   + L infraspinatus  MW   MW L side    Wrist flexors STM nv            UT and infraspinatus (L)      MW MW  + sub-occipital release MW  + sub-occipital release     Neuro Re-Ed             Posture edu MW HEP edu           Sciatic nerve glides x10 ea 2x10 ea 2x10 ea  HEP    2x10    Serratus punch x10 2x10 2x10 x25 2x10  2# nv 2x10  3#      Chin tuck nv 2x10 2x10 x25  5" hold         Scapular retraction x10  5" hold 2x10  5" hold   HEP        clams nv  nv 2x10 ea 2x10 ea x25 ea x25 ea      Eccentric wrist flexion and extension  nv            Leg press     DL: 60#  2x10  SL: 30# ea  2x10 DL: 60#  2x10  SL: 30# ea  2x10 DL: 60#  2x10  SL: 30# ea  2x10 DL: 60#  3x10  SL: 30# ea  3x10  Leg press HEP with band  BTB  x20 ea nv    sidelying external rotation   2x10 ea 2x10 ea 2x10 ea x25 ea x25 ea      TA         2x10  3" hold    TA + fallout         x15 ea    TA + marching         x15 ea    TA + hip add         x15  3" hold    Prone I and T      nv 2x10 ea 2x10 ea     Ther Ex             X-ride nv 10' 10' 10' 10' 10' 10' UBEretro X-ride  10' X-ride  10'    Piriformis stretch 3x30" ea 3x30" ea 3x30" ea 3x30" ea 3x30" ea  3x30" ea 3x30" ea 3x30" ea    Open books nv  5x15" ea 5x15" ea 5x15" ea  5x15" ea  5x15" ea    UT and levator scap stretch nv            Rhomboid stretch nv 5x15" ea 5x15"ea          Hip add nv 2x10  5" hold           sidelying Hip abd nv 2x10 ea 2x10 ea 2x10 ea 2x10 ea nv 2x10 ea x25 ea     Wrist flexion and extension stretch nv 3x30" ea 3x30" ea          3 way hip     2x10 ea  firm nv 2x10 ea  foam 2x10 ea  firm     Butterfly stretch       3x30"  3x30"    Skier stretch       5x15"      Ther Activity                                       Gait Training                                       Modalities

## 2021-06-03 NOTE — PROGRESS NOTES
PT Re-Evaluation     Today's date: 6/3/2021  Patient name: Gen Barkley  : 1965  MRN: [id number]  Referring provider: Hong Mary DO  Dx:   Encounter Diagnosis     ICD-10-CM    1  Chronic left shoulder pain  M25 512     G89 29    2  Left hip pain  M25 552    3  Lumbar radiculopathy  M54 16        Start Time: 1200  Stop Time: 1300  Total time in clinic (min): 60 minutes    Assessment  Assessment details: Patient is a 59 yo female presenting to phyisical therapy with symptoms consistent with L shoulder/elbow pain as well as L sided lumbar radiculopathy that started about 6 weeks ago  Since starting physical therapy the patient has improved with strength, cervical ROM, activity tolerance and posture  Patient is able to sit a work for a longer period of time with less pain, however she continues to be limited in endurance  Patient has also improved in stability in the L LE, however she has a faint burning pain every once in a while in the anterior thigh  PT will continue to address the noted impairments by performing hip and shoulder strengthening, stretching, balance, functional activities and manual techniques to allow the patient to return to her PLOF  PT recommended 2x/week for 2-4 weeks c a good prognosis 2* noted improvements  Impairments: abnormal or restricted ROM, activity intolerance, impaired physical strength, lacks appropriate home exercise program, pain with function, poor posture  and poor body mechanics    Symptom irritability: lowUnderstanding of Dx/Px/POC: good   Prognosis: good    Goals  STG: In four weeks the patient will:    1  Be (I) with her HEP  (in progress)  2  Increase hip and shoulder strength to 4+/5 MMT score to assist c ADLs  (in progress)  3  Increase lumbar and cervical ROM by 25% to assist c work related activities  (MET)      LTG: In six weeks, the patient will:    1   Increase FOTO score to 76 for the shoulder and 93 for the hip to demonstrate improvements in symptoms and function  (in progress)  2  Demonstrate full lumbar and cervical AROM without pain  (70% MET)  3  Perform 60-90 mins of sitting at work without pain  (90% MET)  4  Increase hip and shoulder strength to 5/5 MMT score to assist c prolonged activities  (In progress)  5  Walk 2 miles without discomfort in the LEs  (90% MET)      Plan  Patient would benefit from: skilled physical therapy  Referral necessary: No  Planned modality interventions: cryotherapy and thermotherapy: hydrocollator packs  Planned therapy interventions: abdominal trunk stabilization, joint mobilization, manual therapy, massage, neuromuscular re-education, patient education, postural training, strengthening, stretching, therapeutic activities, therapeutic exercise, transfer training, balance, body mechanics training, breathing training, functional ROM exercises, home exercise program and flexibility  Frequency: 2x week  Duration in visits: 8  Duration in weeks: 4  Plan of Care beginning date: 6/3/2021  Plan of Care expiration date: 2021  Treatment plan discussed with: patient        Subjective Evaluation    History of Present Illness  Mechanism of injury: Patient noted she feels about 70% back to her PLOF  Patient noted that her knee has not given out for about two weeks  Patient noted in general she feels stronger and her leg pain is less  Patient noted her posture has improved at work due to strength and this has helped her pain levels  Patient wants to continue to work on endurance in the LEs and UEs to be able to perform recreational activities and work related activities without pain             Recurrent probem    Quality of life: good    Pain  Current pain ratin  At best pain ratin  At worst pain ratin (at times)  Location: L posterior hip to anterior thigh to L foot; L side of neck -> shoulder to elbow  Quality: burning, dull ache and discomfort (Numbness at times)  Relieving factors: ice, heat and rest  Aggravating factors: standing, walking and sitting  Progression: improved    Social Support  Steps to enter house: no  Stairs in house: no   Lives in: [facility]  Lives with: spouse    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)  Hand dominance: left      Diagnostic Tests  No diagnostic tests performed  Patient Goals  Patient goals for therapy: increased strength, independence with ADLs/IADLs, return to sport/leisure activities and decreased pain  Patient goal: "to learn exercises to assist c pain " (70% MET) "to work with less pain " (70% MET)        Objective     Postural Observations  Seated posture: fair  Standing posture: fair  Correction of posture: makes symptoms better        Neurological Testing     Sensation     Lumbar   Left   Intact: light touch    Right   Intact: light touch    Active Range of Motion   Cervical/Thoracic Spine       Cervical    Flexion:  Restriction level: minimal  Extension:  Restriction level: minimal  Left lateral flexion:  Restriction level: minimal  Right lateral flexion:  Restriction level minimal  Left rotation:  Restriction level: minimal  Right rotation:  Restriction level: minimal    Lumbar   Flexion:  Restriction level: moderate  Extension:  with pain Restriction level: minimal  Left lateral flexion:  Restriction level: minimal  Right lateral flexion:  Restriction level: minimal  Left rotation:  Restriction level: minimal  Right rotation:  Restriction level: minimal    Strength/Myotome Testing     Left Shoulder     Planes of Motion   Flexion: 4   Abduction: 4+   External rotation at 0°: 4   Internal rotation at 0°: 4     Right Shoulder     Planes of Motion   Flexion: 4   Abduction: 4+   External rotation at 0°: 4   Internal rotation at 0°: 4     Left Elbow   Flexion: 5  Extension: 5    Right Elbow   Flexion: 5  Extension: 5    Left Wrist/Hand   Wrist extension: 5  Wrist flexion: 5     (2nd hand position)     Trial 1: 18    Right Wrist/Hand   Wrist extension: 5  Wrist flexion: 5     (2nd hand position)     Trial 1: 32    Left Hip   Planes of Motion   Flexion: 4+  Abduction: 4  Adduction: 5  External rotation: 4  Internal rotation: 4    Right Hip   Planes of Motion   Flexion: 5  Abduction: 4  Adduction: 5  External rotation: 4  Internal rotation: 4    Left Knee   Extension: 4    Right Knee   Extension: 4    Left Ankle/Foot   Dorsiflexion: 4+  Plantar flexion: 4+    Right Ankle/Foot   Dorsiflexion: 4+  Plantar flexion: 4+    General Comments:      Shoulder Comments   Shoulder ROM WNL      Flowsheet Rows      Most Recent Value   PT/OT G-Codes   Current Score  80   Projected Score  76             Precautions: none      Manuals 4/27 4/29 5/3 5/5 5/11 5/13 5/17 5/20 6/1 6/3   Cervical mobilizations  nv            Piriformis STM nv  MW MW   + L infraspinatus  MW   MW L side MW  L side   Wrist flexors STM nv            UT and infraspinatus (L)      MW MW  + sub-occipital release MW  + sub-occipital release     Re-eval          MW   Neuro Re-Ed             Posture edu MW HEP edu           Sciatic nerve glides x10 ea 2x10 ea 2x10 ea  HEP    2x10    Serratus punch x10 2x10 2x10 x25 2x10  2# nv 2x10  3#      Chin tuck nv 2x10 2x10 x25  5" hold         Scapular retraction x10  5" hold 2x10  5" hold   HEP        clams nv  nv 2x10 ea 2x10 ea x25 ea x25 ea      Eccentric wrist flexion and extension  nv            Leg press     DL: 60#  2x10  SL: 30# ea  2x10 DL: 60#  2x10  SL: 30# ea  2x10 DL: 60#  2x10  SL: 30# ea  2x10 DL: 60#  3x10  SL: 30# ea  3x10  Leg press HEP with band  BTB  x20 ea nv    sidelying external rotation   2x10 ea 2x10 ea 2x10 ea x25 ea x25 ea      TA         2x10  3" hold 2x10  5" hold   TA + fallout         x15 ea x15 ea   TA + marching         x15 ea x15 ea   TA + hip add         x15  3" hold 2x10  5" hold   Prone I and T      nv 2x10 ea 2x10 ea     Ther Ex             X-ride nv 10' 10' 10' 10' 10' 10' UBEretro X-ride  10' X-ride  10' UBE  Retro  10' Piriformis stretch 3x30" ea 3x30" ea 3x30" ea 3x30" ea 3x30" ea  3x30" ea 3x30" ea 3x30" ea 3x30" ea   Open books nv  5x15" ea 5x15" ea 5x15" ea  5x15" ea  5x15" ea    UT and levator scap stretch nv            Rhomboid stretch nv 5x15" ea 5x15"ea          Hip add nv 2x10  5" hold           sidelying Hip abd nv 2x10 ea 2x10 ea 2x10 ea 2x10 ea nv 2x10 ea x25 ea     Wrist flexion and extension stretch nv 3x30" ea 3x30" ea          3 way hip     2x10 ea  firm nv 2x10 ea  foam 2x10 ea  firm     Butterfly stretch       3x30"  3x30"    Skier stretch       5x15"      Ther Activity                                       Gait Training                                       Modalities

## 2021-06-08 ENCOUNTER — TRANSCRIBE ORDERS (OUTPATIENT)
Dept: PHYSICAL THERAPY | Facility: CLINIC | Age: 56
End: 2021-06-08

## 2021-06-08 ENCOUNTER — OFFICE VISIT (OUTPATIENT)
Dept: PHYSICAL THERAPY | Facility: CLINIC | Age: 56
End: 2021-06-08
Payer: COMMERCIAL

## 2021-06-08 DIAGNOSIS — M25.552 LEFT HIP PAIN: ICD-10-CM

## 2021-06-08 DIAGNOSIS — G89.29 CHRONIC LEFT SHOULDER PAIN: Primary | ICD-10-CM

## 2021-06-08 DIAGNOSIS — M25.512 CHRONIC LEFT SHOULDER PAIN: Primary | ICD-10-CM

## 2021-06-08 DIAGNOSIS — M54.16 LUMBAR RADICULOPATHY: ICD-10-CM

## 2021-06-08 PROCEDURE — 97112 NEUROMUSCULAR REEDUCATION: CPT

## 2021-06-08 NOTE — PROGRESS NOTES
Daily Note     Today's date: 2021  Patient name: Cyndi Lau  : 1965  MRN: [id number]  Referring provider: Darrell Garcia DO  Dx:   Encounter Diagnosis     ICD-10-CM    1  Chronic left shoulder pain  M25 512     G89 29    2  Left hip pain  M25 552    3  Lumbar radiculopathy  M54 16        Start Time: 730  Stop Time: 830  Total time in clinic (min): 60 minutes    Subjective: Patient noted her pain/tingling in the L thigh went away after last session, however it appeared about a day later  Patient noted she tried riding her bike indoors on her  for the first time in a while and she noted no pain which is an improvement  Objective: See treatment diary below      Assessment: PT performed trigger point release to the L posterior capsule as well as the piriformis to assist c pain  Patient noted improvements post manuals  PT introduced tall pigeon stretch as well as hip hikes and x-walks to assist c stabilization  Patient noted fatigue, however she noted no increased pain  PT answered questions on her leg press exercise at home  PT added to her HEP  Patient would benefit from continued PT to allow the patient to return to her PLOF  Plan: Continue per plan of care        Precautions: none      Manuals  6/3   Cervical mobilizations              Piriformis STM MW  L side   MW   + L infraspinatus  MW   MW L side MW  L side   Wrist flexors STM             UT and infraspinatus (L)      MW MW  + sub-occipital release MW  + sub-occipital release     Re-eval          MW   Neuro Re-Ed             Posture edu             Sciatic nerve glides     HEP    2x10    Serratus punch    x25 2x10  2# nv 2x10  3#      Chin tuck    x25  5" hold         Scapular retraction     HEP        clams    2x10 ea 2x10 ea x25 ea x25 ea      Eccentric wrist flexion and extension              Leg press HEP edu    DL: 60#  2x10  SL: 30# ea  2x10 DL: 60#  2x10  SL: 30# ea  2x10 DL: 60#  2x10  SL: 30# ea  2x10 DL: 60#  3x10  SL: 30# ea  3x10  Leg press HEP with band  BTB  x20 ea nv    sidelying external rotation HEP   2x10 ea 2x10 ea x25 ea x25 ea      TA HEP        2x10  3" hold 2x10  5" hold   TA + fallout x20 ea        x15 ea x15 ea   TA + marching x20 ea        x15 ea x15 ea   TA + hip add         x15  3" hold 2x10  5" hold   Prone I and T HEP     nv 2x10 ea 2x10 ea     Hip hikes Firm  x20 ea            X-walks 2 laps  PTB            Ther Ex             X-ride UBE  Retro  10'   10' 10' 10' 10' UBEretro X-ride  10' X-ride  10' UBE  Retro  10'   Tall pigeon stretch 3x30" ea            Piriformis stretch 3x30" ea   3x30" ea 3x30" ea  3x30" ea 3x30" ea 3x30" ea 3x30" ea   Open books    5x15" ea 5x15" ea  5x15" ea  5x15" ea    UT and levator scap stretch HEP            Rhomboid stretch HEP            Hip add             sidelying Hip abd    2x10 ea 2x10 ea nv 2x10 ea x25 ea     3 way hip 2x10 ea  firm    2x10 ea  firm nv 2x10 ea  foam 2x10 ea  firm     Butterfly stretch       3x30"  3x30"    Skier stretch       5x15"      Ther Activity                                       Gait Training                                       Modalities

## 2021-06-10 ENCOUNTER — OFFICE VISIT (OUTPATIENT)
Dept: PHYSICAL THERAPY | Facility: CLINIC | Age: 56
End: 2021-06-10
Payer: COMMERCIAL

## 2021-06-10 DIAGNOSIS — M25.512 CHRONIC LEFT SHOULDER PAIN: Primary | ICD-10-CM

## 2021-06-10 DIAGNOSIS — M54.16 LUMBAR RADICULOPATHY: ICD-10-CM

## 2021-06-10 DIAGNOSIS — M25.552 LEFT HIP PAIN: ICD-10-CM

## 2021-06-10 DIAGNOSIS — G89.29 CHRONIC LEFT SHOULDER PAIN: Primary | ICD-10-CM

## 2021-06-10 PROCEDURE — 97112 NEUROMUSCULAR REEDUCATION: CPT

## 2021-06-10 NOTE — PROGRESS NOTES
Daily Note     Today's date: 6/10/2021  Patient name: Keyanna Melara  : 1965  MRN: [id number]  Referring provider: Merry Ramirez DO  Dx:   Encounter Diagnosis     ICD-10-CM    1  Chronic left shoulder pain  M25 512     G89 29    2  Left hip pain  M25 552    3  Lumbar radiculopathy  M54 16        Start Time: 0800  Stop Time: 09  Total time in clinic (min): 60 minutes    Subjective: Patient noted she felt good after last time  Patient noted she has been working on the new exercises at home  Objective: See treatment diary below      Assessment: PT educated the patient on the use of a tennis ball/ golf ball at home for STM  Patient required moderate cues for TA contraction during the session  Patient continues to improve with strength 2* less cues required for hip strengthening exercises  Patient noted improvements post session  Patient would benefit from continued PT to allow the patient to return to her PLOF  Plan: Continue per plan of care        Precautions: none      Manuals 6/8 6/10  5/5 5/11 5/13 5/17 5/20 6 6/3   Cervical mobilizations              Piriformis STM MW  L side MW  L side  MW   + L infraspinatus  MW   MW L side MW  L side   Wrist flexors STM             UT and infraspinatus (L)      MW MW  + sub-occipital release MW  + sub-occipital release     Re-eval          MW   Neuro Re-Ed             Posture edu             Sciatic nerve glides HEP    HEP    2x10    Serratus punch HEP   x25 2x10  2# nv 2x10  3#      Chin tuck HEP   x25  5" hold         Scapular retraction HEP    HEP        clams    2x10 ea 2x10 ea x25 ea x25 ea      Leg press HEP edu    DL: 60#  2x10  SL: 30# ea  2x10 DL: 60#  2x10  SL: 30# ea  2x10 DL: 60#  2x10  SL: 30# ea  2x10 DL: 60#  3x10  SL: 30# ea  3x10  Leg press HEP with band  BTB  x20 ea nv    sidelying external rotation HEP   2x10 ea 2x10 ea x25 ea x25 ea      TA HEP        2x10  3" hold 2x10  5" hold   TA + fallout x20 ea x20 ea       x15 ea x15 ea   TA + marching x20 ea x20 ea       x15 ea x15 ea   TA + hip add         x15  3" hold 2x10  5" hold   Prone I and T HEP     nv 2x10 ea 2x10 ea     Hip hikes Firm  x20 ea Firm  2x10 ea           X-walks 2 laps  PTB 2 laps  PTB           Ther Ex             X-ride UBE  Retro  10' X-ride  10'  10' 10' 10' 10' UBEretro X-ride  10' X-ride  10' UBE  Retro  10'   Tall pigeon stretch 3x30" ea 3x30" ea           Piriformis stretch 3x30" ea 3x30" ea  3x30" ea 3x30" ea  3x30" ea 3x30" ea 3x30" ea 3x30" ea   Open books    5x15" ea 5x15" ea  5x15" ea  5x15" ea    UT and levator scap stretch HEP            Rhomboid stretch HEP            Hip add             sidelying Hip abd    2x10 ea 2x10 ea nv 2x10 ea x25 ea     3 way hip 2x10 ea  firm 2x10 ea  firm   2x10 ea  firm nv 2x10 ea  foam 2x10 ea  firm     Butterfly stretch       3x30"  3x30"    Skier stretch       5x15"      Ther Activity                                       Gait Training                                       Modalities

## 2021-06-14 ENCOUNTER — OFFICE VISIT (OUTPATIENT)
Dept: PHYSICAL THERAPY | Facility: CLINIC | Age: 56
End: 2021-06-14
Payer: COMMERCIAL

## 2021-06-14 DIAGNOSIS — M25.552 LEFT HIP PAIN: ICD-10-CM

## 2021-06-14 DIAGNOSIS — M54.16 LUMBAR RADICULOPATHY: ICD-10-CM

## 2021-06-14 DIAGNOSIS — M25.512 CHRONIC LEFT SHOULDER PAIN: Primary | ICD-10-CM

## 2021-06-14 DIAGNOSIS — G89.29 CHRONIC LEFT SHOULDER PAIN: Primary | ICD-10-CM

## 2021-06-14 PROCEDURE — 97112 NEUROMUSCULAR REEDUCATION: CPT

## 2021-06-14 NOTE — PROGRESS NOTES
Daily Note     Today's date: 2021  Patient name: Cyndi Lau  : 1965  MRN: [id number]  Referring provider: Darrell Garcia DO  Dx:   Encounter Diagnosis     ICD-10-CM    1  Chronic left shoulder pain  M25 512     G89 29    2  Left hip pain  M25 552    3  Lumbar radiculopathy  M54 16        Start Time: 0730  Stop Time: 08  Total time in clinic (min): 60 minutes    Subjective: Patient noted she felt good after last session, however Saturday and  she noted radiating pain every so often  Objective: See treatment diary below      Assessment: PT introduced bridges and stool walking as well as ITB stretch with a strap to assist c pain  Patient had not increased pain post session  PT educated the patient on not crossing her LEs throughout the day to decrease pain  PT also educated the patient on following up with her physician about her pain  PT added to her HEP  Patient would benefit from continued PT to allow the patient to return to her PLOF  Plan: Continue per plan of care        Precautions: none      Manuals 6/8 6/10 6/14   5/13 5/17 5/20 6/1 6/3   Cervical mobilizations              Piriformis STM MW  L side MW  L side    MW   MW L side MW  L side   Wrist flexors STM             UT and infraspinatus (L)      MW MW  + sub-occipital release MW  + sub-occipital release     Re-eval          MW   Neuro Re-Ed             Posture edu             Sciatic nerve glides HEP  2x10 ea      2x10    Serratus punch HEP     nv 2x10  3#      Chin tuck HEP            Scapular retraction HEP            clams      x25 ea x25 ea      Leg press HEP edu  nv   DL: 60#  2x10  SL: 30# ea  2x10 DL: 60#  2x10  SL: 30# ea  2x10 DL: 60#  3x10  SL: 30# ea  3x10  Leg press HEP with band  BTB  x20 ea nv    sidelying external rotation HEP     x25 ea x25 ea      TA HEP        2x10  3" hold 2x10  5" hold   TA + fallout x20 ea x20 ea x20 ea      x15 ea x15 ea   TA + marching x20 ea x20 ea       x15 ea x15 ea TA + hip add         x15  3" hold 2x10  5" hold   Prone I and T HEP     nv 2x10 ea 2x10 ea     Hip hikes Firm  x20 ea Firm  2x10 ea           X-walks 2 laps  PTB 2 laps  PTB           TA + bridge   2x10  5" hold          Ther Ex             X-ride UBE  Retro  10' X-ride  10' X-ride  10'   10' 10' UBEretro X-ride  10' X-ride  10' UBE  Retro  10'   Tall pigeon stretch 3x30" ea 3x30" ea 3x30" ea          Piriformis stretch 3x30" ea 3x30" ea 3x30" ea    3x30" ea 3x30" ea 3x30" ea 3x30" ea   Open books       5x15" ea  5x15" ea    UT and levator scap stretch HEP            Rhomboid stretch HEP            Hip add             sidelying Hip abd      nv 2x10 ea x25 ea     3 way hip 2x10 ea  firm 2x10 ea  firm    nv 2x10 ea  foam 2x10 ea  firm     Butterfly stretch   Strap hip add stretch  3x30" ea    3x30"  3x30"    ITB strap stretch   3x30" ea          Skier stretch       5x15"      Stool walking   2 laps          Ther Activity                                       Gait Training                                       Modalities

## 2021-06-15 ENCOUNTER — APPOINTMENT (OUTPATIENT)
Dept: PHYSICAL THERAPY | Facility: CLINIC | Age: 56
End: 2021-06-15
Payer: COMMERCIAL

## 2021-06-17 ENCOUNTER — OFFICE VISIT (OUTPATIENT)
Dept: PHYSICAL THERAPY | Facility: CLINIC | Age: 56
End: 2021-06-17
Payer: COMMERCIAL

## 2021-06-17 DIAGNOSIS — M54.16 LUMBAR RADICULOPATHY: ICD-10-CM

## 2021-06-17 DIAGNOSIS — M25.512 CHRONIC LEFT SHOULDER PAIN: Primary | ICD-10-CM

## 2021-06-17 DIAGNOSIS — M25.552 LEFT HIP PAIN: ICD-10-CM

## 2021-06-17 DIAGNOSIS — G89.29 CHRONIC LEFT SHOULDER PAIN: Primary | ICD-10-CM

## 2021-06-17 PROCEDURE — 97112 NEUROMUSCULAR REEDUCATION: CPT

## 2021-06-17 NOTE — PROGRESS NOTES
Daily Note     Today's date: 2021  Patient name: Abel Hartley  : 1965  MRN: [id number]  Referring provider: Mayito Buchanan DO  Dx:   Encounter Diagnosis     ICD-10-CM    1  Chronic left shoulder pain  M25 512     G89 29    2  Left hip pain  M25 552    3  Lumbar radiculopathy  M54 16        Start Time: 0800  Stop Time: 0900  Total time in clinic (min): 60 minutes    Subjective: Patient noted she feels better, however at times she continues to get the radiating pain down the thigh  Patient noted that she has been better with uncrossing her legs, however at work she does not place full weight in her L LE  Objective: See treatment diary below      Assessment: PT introduced prone press ups to assist c radiating pain  Patient performed 2x10 with improvement in symptoms  PT educated the patient on when to stop the prone press ups if symptoms worsen  PT educated the patient on placing even weight in (B) LE when standing at work  Patient performed exercises with min VCs and improved control  Patient noted improvements in pain levels post session  Patient would benefit from continued PT to allow the patient to return to her PLOF  Plan: Continue per plan of care        Precautions: none      Manuals 6/8 6/10 6/14 6/17  5/13 5/17 5/20 6/1 6/3   Cervical mobilizations              Piriformis STM MW  L side MW  L side    MW   MW L side MW  L side   Wrist flexors STM             UT and infraspinatus (L)      MW MW  + sub-occipital release MW  + sub-occipital release     Lumbar mobilizations and pelvic rocking    MW  Grade  III         Re-eval          MW   Neuro Re-Ed             Posture edu             Sciatic nerve glides HEP  2x10 ea      2x10    Serratus punch HEP     nv 2x10  3#      Chin tuck HEP            Scapular retraction HEP            clams      x25 ea x25 ea      Leg press HEP edu  nv DL: 60#  3x10  SL: 30# ea  3x10  DL: 60#  2x10  SL: 30# ea  2x10 DL: 60#  2x10  SL: 30# ea  2x10 DL: 60#  3x10  SL: 30# ea  3x10  Leg press HEP with band  BTB  x20 ea nv    sidelying external rotation HEP     x25 ea x25 ea      TA HEP        2x10  3" hold 2x10  5" hold   TA + fallout x20 ea x20 ea x20 ea x30 ea     x15 ea x15 ea   TA + marching x20 ea x20 ea  x30 ea     x15 ea x15 ea   TA + hip add         x15  3" hold 2x10  5" hold   Prone I and T HEP     nv 2x10 ea 2x10 ea     Hip hikes Firm  x20 ea Firm  2x10 ea  Foam  3x10 ea         X-walks 2 laps  PTB 2 laps  PTB  GTB  2 laps         TA + bridge   2x10  5" hold 3x10  5" hold         Prone press ups    2x10         Ther Ex             X-ride UBE  Retro  10' X-ride  10' X-ride  10' X-ride  10'  10' 10' UBEretro X-ride  10' X-ride  10' UBE  Retro  10'   Tall pigeon stretch 3x30" ea 3x30" ea 3x30" ea 3x30" ea         Piriformis stretch 3x30" ea 3x30" ea 3x30" ea 3x30" ea   3x30" ea 3x30" ea 3x30" ea 3x30" ea   Open books       5x15" ea  5x15" ea    UT and levator scap stretch HEP            Rhomboid stretch HEP            Hip add             sidelying Hip abd      nv 2x10 ea x25 ea     3 way hip 2x10 ea  firm 2x10 ea  firm    nv 2x10 ea  foam 2x10 ea  firm     Butterfly stretch   Strap hip add stretch  3x30" ea    3x30"  3x30"    ITB strap stretch   3x30" ea 3x30" ea         Skier stretch       5x15"      Stool walking   2 laps 2 laps         Ther Activity                                       Gait Training                                       Modalities

## 2021-06-22 ENCOUNTER — APPOINTMENT (OUTPATIENT)
Dept: PHYSICAL THERAPY | Facility: CLINIC | Age: 56
End: 2021-06-22
Payer: COMMERCIAL

## 2021-06-24 ENCOUNTER — OFFICE VISIT (OUTPATIENT)
Dept: PHYSICAL THERAPY | Facility: CLINIC | Age: 56
End: 2021-06-24
Payer: COMMERCIAL

## 2021-06-24 DIAGNOSIS — M54.16 LUMBAR RADICULOPATHY: ICD-10-CM

## 2021-06-24 DIAGNOSIS — G89.29 CHRONIC LEFT SHOULDER PAIN: Primary | ICD-10-CM

## 2021-06-24 DIAGNOSIS — M25.552 LEFT HIP PAIN: ICD-10-CM

## 2021-06-24 DIAGNOSIS — M25.512 CHRONIC LEFT SHOULDER PAIN: Primary | ICD-10-CM

## 2021-06-24 PROCEDURE — 97112 NEUROMUSCULAR REEDUCATION: CPT

## 2021-06-24 NOTE — PROGRESS NOTES
PT Discharge    Today's date: 2021  Patient name: Bebe Sandra  : 1965  MRN: [id number]  Referring provider: Arturo Champagne DO  Dx:   Encounter Diagnosis     ICD-10-CM    1  Chronic left shoulder pain  M25 512     G89 29    2  Left hip pain  M25 552    3  Lumbar radiculopathy  M54 16        Start Time: 0800  Stop Time: 0900  Total time in clinic (min): 60 minutes    Assessment  Assessment details: Patient is a 59 yo female presenting to phyisical therapy with symptoms consistent with L shoulder/elbow pain as well as L sided lumbar radiculopathy that started about 6 weeks ago  Since last re-evaluation the patient has improved with shoulder and hip strength, endurance, posture and activity tolerance  Patient is able to performed her HEP (I) as well as ADLs with minimal to no pain  Due to noted improvements, the patient will be D/C from PT with a HEP  PT and patient agree with POC  Symptom irritability: lowUnderstanding of Dx/Px/POC: good   Prognosis: good    Goals  STG: In four weeks the patient will:    1  Be (I) with her HEP  (MET)  2  Increase hip and shoulder strength to 4+/5 MMT score to assist c ADLs  (MET)  3  Increase lumbar and cervical ROM by 25% to assist c work related activities  (MET)      LTG: In six weeks, the patient will:    1  Increase FOTO score to 76 for the shoulder and 93 for the hip to demonstrate improvements in symptoms and function  (MET)  2  Demonstrate full lumbar and cervical AROM without pain  (MET)  3  Perform 60-90 mins of sitting at work without pain  (MET)  4  Increase hip and shoulder strength to 5/5 MMT score to assist c prolonged activities  (In progress)  5   Walk 2 miles without discomfort in the LEs   (MET)      Plan  Duration in visits: 1  Plan of Care beginning date: 2021  Plan of Care expiration date: 2021  Treatment plan discussed with: patient        Subjective Evaluation    History of Present Illness  Mechanism of injury: Patient noted she feels about 90% back to her PLOF  Patient noted in general her shoulder feels back to her PLOF  Patient noted that her L LE feels better and has less tingling  Patient feels that she is (I) with her HEP and ADLs             Recurrent probem    Quality of life: good    Pain  Current pain ratin  At best pain ratin  At worst pain rating: 3 (at times)  Location: L posterior hip to anterior thigh to L foot  Quality: burning and dull ache (Numbness at times)  Relieving factors: ice, heat and rest  Aggravating factors: standing, walking and sitting  Progression: improved    Social Support  Steps to enter house: no  Stairs in house: no   Lives in: [facility]  Lives with: spouse    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)  Hand dominance: left      Diagnostic Tests  No diagnostic tests performed  Patient Goals  Patient goal: "to learn exercises to assist c pain " (MET) "to work with less pain " (MET)        Objective     Postural Observations  Seated posture: fair  Standing posture: fair  Correction of posture: makes symptoms better        Neurological Testing     Sensation     Lumbar   Left   Intact: light touch    Right   Intact: light touch    Active Range of Motion   Cervical/Thoracic Spine       Cervical    Flexion:  Restriction level: minimal  Extension:  Restriction level: minimal  Left lateral flexion:  Restriction level: minimal  Right lateral flexion:  Restriction level minimal  Left rotation:  Restriction level: minimal  Right rotation:  Restriction level: minimal    Lumbar   Flexion:  Restriction level: moderate  Extension:  with pain Restriction level: minimal  Left lateral flexion:  Restriction level: minimal  Right lateral flexion:  Restriction level: minimal  Left rotation:  Restriction level: minimal  Right rotation:  Restriction level: minimal    Strength/Myotome Testing     Left Shoulder     Planes of Motion   Flexion: 5   Abduction: 5   External rotation at 0°: 5 Internal rotation at 0°: 5     Right Shoulder     Planes of Motion   Flexion: 5   Abduction: 5   External rotation at 0°: 5   Internal rotation at 0°: 5     Left Elbow   Flexion: 5  Extension: 5    Right Elbow   Flexion: 5  Extension: 5    Left Wrist/Hand   Wrist extension: 5  Wrist flexion: 5     (2nd hand position)     Trial 1: 18    Right Wrist/Hand   Wrist extension: 5  Wrist flexion: 5     (2nd hand position)     Trial 1: 32    Left Hip   Planes of Motion   Flexion: 4+  Abduction: 4+  Adduction: 5  External rotation: 4+  Internal rotation: 4+    Right Hip   Planes of Motion   Flexion: 5  Abduction: 4+  Adduction: 5  External rotation: 4+  Internal rotation: 4+    Left Knee   Extension: 4    Right Knee   Extension: 4    Left Ankle/Foot   Dorsiflexion: 4+  Plantar flexion: 4+    Right Ankle/Foot   Dorsiflexion: 4+  Plantar flexion: 4+    General Comments:      Shoulder Comments   Shoulder ROM WNL             Precautions: none      Manuals 6/8 6/10 6/14 6/17 6/24 5/13 5/17 5/20 6/1 6/3   Cervical mobilizations              Piriformis STM MW  L side MW  L side    MW   MW L side MW  L side   Wrist flexors STM             UT and infraspinatus (L)      MW MW  + sub-occipital release MW  + sub-occipital release     Lumbar mobilizations and pelvic rocking    MW  Grade  III         Re-eval     MW     MW   Neuro Re-Ed             Posture edu     HEP edu        Sciatic nerve glides HEP  2x10 ea      2x10    Serratus punch HEP     nv 2x10  3#      Chin tuck HEP            Scapular retraction HEP            clams      x25 ea x25 ea      Leg press HEP edu  nv DL: 60#  3x10  SL: 30# ea  3x10 DL: 60#  3x10  SL: 30# ea  3x10 DL: 60#  2x10  SL: 30# ea  2x10 DL: 60#  2x10  SL: 30# ea  2x10 DL: 60#  3x10  SL: 30# ea  3x10  Leg press HEP with band  BTB  x20 ea nv    sidelying external rotation HEP     x25 ea x25 ea      TA HEP        2x10  3" hold 2x10  5" hold   TA + fallout x20 ea x20 ea x20 ea x30 ea     x15 ea x15 ea TA + marching x20 ea x20 ea  x30 ea     x15 ea x15 ea   TA + hip add         x15  3" hold 2x10  5" hold   Prone I and T HEP    2x10 ea nv 2x10 ea 2x10 ea     Hip hikes Firm  x20 ea Firm  2x10 ea  Foam  3x10 ea         X-walks 2 laps  PTB 2 laps  PTB  GTB  2 laps         TA + bridge   2x10  5" hold 3x10  5" hold 3x10  5" hold  x10 on ball        Prone press ups    2x10 x10        Ther Ex             X-ride UBE  Retro  10' X-ride  10' X-ride  10' X-ride  10' X-ride  10' 10' 10' UBEretro X-ride  10' X-ride  10' UBE  Retro  10'   Tall pigeon stretch 3x30" ea 3x30" ea 3x30" ea 3x30" ea         Piriformis stretch 3x30" ea 3x30" ea 3x30" ea 3x30" ea   3x30" ea 3x30" ea 3x30" ea 3x30" ea   Open books       5x15" ea  5x15" ea    UT and levator scap stretch HEP            Rhomboid stretch HEP            Hip add             sidelying Hip abd      nv 2x10 ea x25 ea     3 way hip 2x10 ea  firm 2x10 ea  firm   Foam  2x10 ea nv 2x10 ea  foam 2x10 ea  firm     Butterfly stretch   Strap hip add stretch  3x30" ea    3x30"  3x30"    ITB strap stretch   3x30" ea 3x30" ea         Skier stretch       5x15"      Stool walking   2 laps 2 laps         Ther Activity                                       Gait Training                                       Modalities

## 2021-06-24 NOTE — LETTER
2021    María Stuart DO  [address] [place]  [address] [facility]    Patient: Jose Martin Melgar   YOB: 1965   Date of Visit: 2021     Encounter Diagnosis     ICD-10-CM    1  Chronic left shoulder pain  M25 512     G89 29    2  Left hip pain  M25 552    3  Lumbar radiculopathy  M54 16        Dear Dr Micky Harley: Thank you for your recent referral of Jose Martin Melgar  Please review the attached evaluation summary from Munira's recent visit  Please verify that you agree with the plan of care by signing the attached order  If you have any questions or concerns, please do not hesitate to call  I sincerely appreciate the opportunity to share in the care of one of your patients and hope to have another opportunity to work with you in the near future  Sincerely,    Hannah Jaimes, PT      Referring Provider:      I certify that I have read the below Plan of Care and certify the need for these services furnished under this plan of treatment while under my care  María Stuart DO  [address]  [address] [facility]  Via Fax: [phone]          PT Discharge    Today's date: 2021  Patient name: Jose Martin Melgar  : 1965  MRN: [id number]  Referring provider: Valentino Grice, DO  Dx:   Encounter Diagnosis     ICD-10-CM    1  Chronic left shoulder pain  M25 512     G89 29    2  Left hip pain  M25 552    3  Lumbar radiculopathy  M54 16        Start Time: 0800  Stop Time: 0900  Total time in clinic (min): 60 minutes    Assessment  Assessment details: Patient is a 59 yo female presenting to phyisical therapy with symptoms consistent with L shoulder/elbow pain as well as L sided lumbar radiculopathy that started about 6 weeks ago  Since last re-evaluation the patient has improved with shoulder and hip strength, endurance, posture and activity tolerance   Patient is able to performed her HEP (I) as well as ADLs with minimal to no pain  Due to noted improvements, the patient will be D/C from PT with a HEP  PT and patient agree with POC  Symptom irritability: lowUnderstanding of Dx/Px/POC: good   Prognosis: good    Goals  STG: In four weeks the patient will:    1  Be (I) with her HEP  (MET)  2  Increase hip and shoulder strength to 4+/5 MMT score to assist c ADLs  (MET)  3  Increase lumbar and cervical ROM by 25% to assist c work related activities  (MET)      LTG: In six weeks, the patient will:    1  Increase FOTO score to 76 for the shoulder and 93 for the hip to demonstrate improvements in symptoms and function  (MET)  2  Demonstrate full lumbar and cervical AROM without pain  (MET)  3  Perform 60-90 mins of sitting at work without pain  (MET)  4  Increase hip and shoulder strength to 5/5 MMT score to assist c prolonged activities  (In progress)  5  Walk 2 miles without discomfort in the LEs   (MET)      Plan  Duration in visits: 1  Plan of Care beginning date: 2021  Plan of Care expiration date: 2021  Treatment plan discussed with: patient        Subjective Evaluation    History of Present Illness  Mechanism of injury: Patient noted she feels about 90% back to her PLOF  Patient noted in general her shoulder feels back to her PLOF  Patient noted that her L LE feels better and has less tingling  Patient feels that she is (I) with her HEP and ADLs             Recurrent probem    Quality of life: good    Pain  Current pain ratin  At best pain ratin  At worst pain rating: 3 (at times)  Location: L posterior hip to anterior thigh to L foot  Quality: burning and dull ache (Numbness at times)  Relieving factors: ice, heat and rest  Aggravating factors: standing, walking and sitting  Progression: improved    Social Support  Steps to enter house: no  Stairs in house: no   Lives in: [facility]  Lives with: spouse    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)  Hand dominance: left      Diagnostic Tests  No diagnostic tests performed  Patient Goals  Patient goal: "to learn exercises to assist c pain " (MET) "to work with less pain " (MET)        Objective     Postural Observations  Seated posture: fair  Standing posture: fair  Correction of posture: makes symptoms better        Neurological Testing     Sensation     Lumbar   Left   Intact: light touch    Right   Intact: light touch    Active Range of Motion   Cervical/Thoracic Spine       Cervical    Flexion:  Restriction level: minimal  Extension:  Restriction level: minimal  Left lateral flexion:  Restriction level: minimal  Right lateral flexion:  Restriction level minimal  Left rotation:  Restriction level: minimal  Right rotation:  Restriction level: minimal    Lumbar   Flexion:  Restriction level: moderate  Extension:  with pain Restriction level: minimal  Left lateral flexion:  Restriction level: minimal  Right lateral flexion:  Restriction level: minimal  Left rotation:  Restriction level: minimal  Right rotation:  Restriction level: minimal    Strength/Myotome Testing     Left Shoulder     Planes of Motion   Flexion: 5   Abduction: 5   External rotation at 0°: 5   Internal rotation at 0°: 5     Right Shoulder     Planes of Motion   Flexion: 5   Abduction: 5   External rotation at 0°: 5   Internal rotation at 0°: 5     Left Elbow   Flexion: 5  Extension: 5    Right Elbow   Flexion: 5  Extension: 5    Left Wrist/Hand   Wrist extension: 5  Wrist flexion: 5     (2nd hand position)     Trial 1: 18    Right Wrist/Hand   Wrist extension: 5  Wrist flexion: 5     (2nd hand position)     Trial 1: 32    Left Hip   Planes of Motion   Flexion: 4+  Abduction: 4+  Adduction: 5  External rotation: 4+  Internal rotation: 4+    Right Hip   Planes of Motion   Flexion: 5  Abduction: 4+  Adduction: 5  External rotation: 4+  Internal rotation: 4+    Left Knee   Extension: 4    Right Knee   Extension: 4    Left Ankle/Foot   Dorsiflexion: 4+  Plantar flexion: 4+    Right Ankle/Foot   Dorsiflexion: 4+  Plantar flexion: 4+    General Comments:      Shoulder Comments   Shoulder ROM WNL             Precautions: none      Manuals 6/8 6/10 6/14 6/17 6/24 5/13 5/17 5/20 6/1 6/3   Cervical mobilizations              Piriformis STM MW  L side MW  L side    MW   MW L side MW  L side   Wrist flexors STM             UT and infraspinatus (L)      MW MW  + sub-occipital release MW  + sub-occipital release     Lumbar mobilizations and pelvic rocking    MW  Grade  III         Re-eval     MW     MW   Neuro Re-Ed             Posture edu     HEP edu        Sciatic nerve glides HEP  2x10 ea      2x10    Serratus punch HEP     nv 2x10  3#      Chin tuck HEP            Scapular retraction HEP            clams      x25 ea x25 ea      Leg press HEP edu  nv DL: 60#  3x10  SL: 30# ea  3x10 DL: 60#  3x10  SL: 30# ea  3x10 DL: 60#  2x10  SL: 30# ea  2x10 DL: 60#  2x10  SL: 30# ea  2x10 DL: 60#  3x10  SL: 30# ea  3x10  Leg press HEP with band  BTB  x20 ea nv    sidelying external rotation HEP     x25 ea x25 ea      TA HEP        2x10  3" hold 2x10  5" hold   TA + fallout x20 ea x20 ea x20 ea x30 ea     x15 ea x15 ea   TA + marching x20 ea x20 ea  x30 ea     x15 ea x15 ea   TA + hip add         x15  3" hold 2x10  5" hold   Prone I and T HEP    2x10 ea nv 2x10 ea 2x10 ea     Hip hikes Firm  x20 ea Firm  2x10 ea  Foam  3x10 ea         X-walks 2 laps  PTB 2 laps  PTB  GTB  2 laps         TA + bridge   2x10  5" hold 3x10  5" hold 3x10  5" hold  x10 on ball        Prone press ups    2x10 x10        Ther Ex             X-ride UBE  Retro  10' X-ride  10' X-ride  10' X-ride  10' X-ride  10' 10' 10' UBEretro X-ride  10' X-ride  10' UBE  Retro  10'   Tall pigeon stretch 3x30" ea 3x30" ea 3x30" ea 3x30" ea         Piriformis stretch 3x30" ea 3x30" ea 3x30" ea 3x30" ea   3x30" ea 3x30" ea 3x30" ea 3x30" ea   Open books       5x15" ea  5x15" ea    UT and levator scap stretch HEP            Rhomboid stretch HEP            Hip add             sidelying Hip abd      nv 2x10 ea x25 ea     3 way hip 2x10 ea  firm 2x10 ea  firm   Foam  2x10 ea nv 2x10 ea  foam 2x10 ea  firm     Butterfly stretch   Strap hip add stretch  3x30" ea    3x30"  3x30"    ITB strap stretch   3x30" ea 3x30" ea         Skier stretch       5x15"      Stool walking   2 laps 2 laps         Ther Activity                                       Gait Training                                       Modalities

## 2022-02-04 PROBLEM — K62.5 RECTAL BLEEDING: Status: ACTIVE | Noted: 2022-02-04
